# Patient Record
Sex: FEMALE | URBAN - METROPOLITAN AREA
[De-identification: names, ages, dates, MRNs, and addresses within clinical notes are randomized per-mention and may not be internally consistent; named-entity substitution may affect disease eponyms.]

---

## 2020-07-10 ENCOUNTER — OFFICE VISIT (OUTPATIENT)
Dept: URBAN - METROPOLITAN AREA CLINIC 48 | Facility: CLINIC | Age: 65
End: 2020-07-10
Payer: MEDICARE

## 2020-07-10 DIAGNOSIS — H25.812 COMBINED FORMS OF AGE-RELATED CATARACT, LEFT EYE: Primary | ICD-10-CM

## 2020-07-10 PROCEDURE — 92014 COMPRE OPH EXAM EST PT 1/>: CPT | Performed by: OPHTHALMOLOGY

## 2020-07-10 ASSESSMENT — KERATOMETRY
OS: 46.00
OD: 46.38

## 2020-07-10 ASSESSMENT — INTRAOCULAR PRESSURE
OS: 19
OD: 18

## 2020-07-10 NOTE — IMPRESSION/PLAN
Impression: Combined forms of age-related cataract, left eye: H25.812. Plan: Patient to call office if there is any changes in vision. 

RTC 12-18 months cat eval ( long exam)